# Patient Record
Sex: FEMALE | Race: WHITE | NOT HISPANIC OR LATINO | Employment: UNEMPLOYED | URBAN - METROPOLITAN AREA
[De-identification: names, ages, dates, MRNs, and addresses within clinical notes are randomized per-mention and may not be internally consistent; named-entity substitution may affect disease eponyms.]

---

## 2021-10-07 ENCOUNTER — TRANSCRIBE ORDERS (OUTPATIENT)
Dept: ADMINISTRATIVE | Facility: HOSPITAL | Age: 31
End: 2021-10-07

## 2021-10-07 DIAGNOSIS — N63.0 BREAST LUMP: Primary | ICD-10-CM

## 2021-10-27 ENCOUNTER — HOSPITAL ENCOUNTER (OUTPATIENT)
Dept: ULTRASOUND IMAGING | Facility: HOSPITAL | Age: 31
End: 2021-10-27

## 2022-06-27 PROBLEM — M54.81 CERVICO-OCCIPITAL NEURALGIA: Status: ACTIVE | Noted: 2021-09-05

## 2022-06-27 PROBLEM — M54.2 CHRONIC NECK PAIN: Status: ACTIVE | Noted: 2021-09-02

## 2022-06-27 PROBLEM — G89.29 CHRONIC NECK PAIN: Status: ACTIVE | Noted: 2021-09-02

## 2022-07-25 ENCOUNTER — OFFICE VISIT (OUTPATIENT)
Dept: SURGERY | Facility: CLINIC | Age: 32
End: 2022-07-25

## 2022-07-25 VITALS
SYSTOLIC BLOOD PRESSURE: 133 MMHG | DIASTOLIC BLOOD PRESSURE: 81 MMHG | WEIGHT: 224 LBS | HEIGHT: 65 IN | TEMPERATURE: 97.8 F | BODY MASS INDEX: 37.32 KG/M2 | HEART RATE: 74 BPM | OXYGEN SATURATION: 99 %

## 2022-07-25 DIAGNOSIS — K64.4 EXTERNAL HEMORRHOIDS: Primary | ICD-10-CM

## 2022-07-25 PROBLEM — M79.18 MYOFASCIAL PAIN: Status: ACTIVE | Noted: 2021-10-28

## 2022-07-25 PROBLEM — M47.812 CERVICAL SPONDYLOSIS WITHOUT MYELOPATHY: Status: ACTIVE | Noted: 2021-10-28

## 2022-07-25 PROCEDURE — 99203 OFFICE O/P NEW LOW 30 MIN: CPT | Performed by: SURGERY

## 2022-07-25 RX ORDER — SUMATRIPTAN 50 MG/1
TABLET, FILM COATED ORAL
COMMUNITY

## 2022-07-25 NOTE — PROGRESS NOTES
Patient: Rosemary Monterroso    YOB: 1990    Date: 07/25/2022    Primary Care Provider: Violeta Quinteros APRN    Chief Complaint   Patient presents with   • Rectal Problems     Hemorrhoids       SUBJECTIVE:    History of present illness: Patient states that she has had hemorrhoid issues since she was 14.  Worse since her pregnancy.  She states that she can feel some extra tissue around the anal area.  Occasionally causes some pain and seldomly has a minimal amount of bleeding perhaps once or twice a year.  She states that her hemorrhoids are always out.    The following portions of the patient's history were reviewed and updated as appropriate: allergies, current medications, past family history, past medical history, past social history, past surgical history and problem list.      Review of Systems   Constitutional: Negative for chills, diaphoresis, fatigue and fever.   HENT: Negative for dental problem, drooling, ear discharge and hearing loss.    Respiratory: Negative for cough, choking, chest tightness and shortness of breath.    Cardiovascular: Negative for chest pain, palpitations and leg swelling.   Gastrointestinal: Positive for anal bleeding and rectal pain. Negative for blood in stool.   Endocrine: Negative for cold intolerance and heat intolerance.   Genitourinary: Negative for flank pain, frequency and hematuria.   Neurological: Negative for seizures, light-headedness, numbness and headaches.   Psychiatric/Behavioral: Negative for agitation, behavioral problems and confusion.       Allergies:  No Known Allergies    Medications:    Current Outpatient Medications:   •  buPROPion SR (WELLBUTRIN SR) 150 MG 12 hr tablet, Take  by mouth See Admin Instructions. Take 1 tablet by mouth every day for 3 days and then take 1 tablet by mouth twice daily thereafter, Disp: , Rfl:   •  cetirizine (zyrTEC) 10 MG tablet, Take 10 mg by mouth Daily., Disp: , Rfl:   •  diclofenac (VOLTAREN) 75 MG EC tablet, Take 75  "mg by mouth 2 (Two) Times a Day., Disp: , Rfl:   •  fluticasone (FLONASE) 50 MCG/ACT nasal spray, SHAKE LIQUID AND USE 1 SPRAY IN EACH NOSTRIL EVERY DAY, Disp: , Rfl:   •  Humira Pen 80 MG/0.8ML injection, , Disp: , Rfl:   •  levocetirizine (XYZAL) 5 MG tablet, Take 5 mg by mouth Every Evening., Disp: , Rfl:   •  Nurtec 75 MG dispersible tablet, DISSOLVE 1 TABLET ON THE TONGUE IF NEEDED FOR HEADACHE ONCE A DAY, Disp: , Rfl:   •  SUMAtriptan (IMITREX) 50 MG tablet, sumatriptan 50 mg tablet, Disp: , Rfl:   •  topiramate (TOPAMAX) 25 MG tablet, Take 25 mg by mouth Daily., Disp: , Rfl:   •  Vandazole 0.75 % vaginal gel, APPLY TO FACE EVERY DAY AT BEDTIME, Disp: , Rfl:     History:  History reviewed. No pertinent past medical history.    No past surgical history on file.    History reviewed. No pertinent family history.    Social History     Tobacco Use   • Smoking status: Never Smoker   • Smokeless tobacco: Never Used   Vaping Use   • Vaping Use: Every day   • Substances: Nicotine, Flavoring   Substance Use Topics   • Alcohol use: Yes   • Drug use: Yes     Types: Marijuana        OBJECTIVE:    Vital Signs:   Vitals:    07/25/22 1345   BP: 133/81   Pulse: 74   Temp: 97.8 °F (36.6 °C)   SpO2: 99%   Weight: 102 kg (224 lb)   Height: 165.1 cm (65\")       Physical Exam:   General Appearance:    Alert, cooperative, in no acute distress   Head:    Normocephalic, without obvious abnormality, atraumatic   Eyes:            Normal.  No scleral icterus.  PERRLA    Lungs:     Clear to auscultation,respirations regular, even and                  unlabored    Heart:    Regular rhythm and normal rate, normal S1 and S2, no            murmur   Extremities:   Moves all extremities well, no edema, no cyanosis, no             redness   Skin:   No bleeding, bruising or rash   Neurologic:   Normal without gross deficits.   Psychiatric: No evidence of depression or anxiety   Anal exam: Very small bilateral hemorrhoid tags but otherwise exam is " normal.  Digital exam normal.       Results Review:   None    Review of Systems was reviewed and confirmed as accurate as documented by the MA.    ASSESSMENT/PLAN:    1. External hemorrhoids        Exam essentially normal.  Only very small hemorrhoid tags are present and I reassured her that this does not warrant any surgical intervention at this time.  I recommend avoidance of any constipation, high-fiber diet or fiber supplement of choice if needed.  If she has worsening symptoms she should follow-up with me.        Electronically signed by Facundo Ohara MD  07/25/22